# Patient Record
Sex: FEMALE | Race: WHITE
[De-identification: names, ages, dates, MRNs, and addresses within clinical notes are randomized per-mention and may not be internally consistent; named-entity substitution may affect disease eponyms.]

---

## 2018-09-30 ENCOUNTER — HOSPITAL ENCOUNTER (EMERGENCY)
Dept: HOSPITAL 80 - FED | Age: 28
Discharge: HOME | End: 2018-09-30
Payer: OTHER GOVERNMENT

## 2018-09-30 VITALS — SYSTOLIC BLOOD PRESSURE: 116 MMHG | DIASTOLIC BLOOD PRESSURE: 77 MMHG
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
## 2018-09-30 DIAGNOSIS — R11.2: ICD-10-CM

## 2018-09-30 DIAGNOSIS — E86.9: ICD-10-CM

## 2018-09-30 DIAGNOSIS — S80.212A: Primary | ICD-10-CM

## 2018-09-30 LAB — PLATELET # BLD: 314 10^3/UL (ref 150–400)

## 2018-09-30 NOTE — EDPHY
H & P


Time Seen by Provider: 09/30/18 01:43


HPI/ROS: 





HPI





CHIEF COMPLAINT:  Nausea vomiting





HISTORY OF PRESENT ILLNESS:  Very pleasant 28-year-old female, history of 

migraine headaches otherwise healthy, presents emergency room nausea vomiting.  

Patient was at work tonight.  She works 6:00 p.m. To 6:00 a.m. It is now 2:00 

a.m..  She works as a dispatcher.  She states she was sitting down at work and 

ultrasound very nauseous and vomited 1 time.  No headache.  Denies chest pain 

shortness of breath, denies cough.  Denies abdominal pain.  She states earlier 

today she felt like she had a fever to 102. Took a temperature orally.  No 

urinary symptoms.  She does have abrasions left lateral knee.  But no 

significant swelling or pain or redness.


Due to the vomiting at work not feeling well she decided come the emergency 

room.  She denies headache or stiff neck.  Denies fever currently.  Denies 

cough or shortness of breath.





  





Past Medical History:  Migraine headaches





Past Surgical History:  Denies recent surgery





Social History:  Denies drugs alcohol tobacco.





Family History:  Noncontributory








ROS   


REVIEW OF SYSTEMS:


10 Systems were reviewed and negative with the exception of the elements 

mentioned in the history of present illness.








Exam   


Constitutional  appears well nontoxic vital signs stable afebrile triage 

nursing summary reviewed, vital signs reviewed, awake/alert. 


Eyes   normal conjunctivae and sclera, EOMI, PERRLA. 


HENT   normal inspection, atraumatic, moist mucus membranes, no epistaxis, neck 

supple/ no meningismus, no raccoon eyes. 


Respiratory   clear to auscultation bilaterally, normal breath sounds, no 

respiratory distress, no wheezing. 


Cardiovascular   rate normal, regular rhythm, no murmur, no edema, distal 

pulses normal. 


Gastrointestinal   soft, non-tender, no rebound, no guarding, normal bowel 

sounds, no distension, no pulsatile mass. 


Genitourinary   no CVA tenderness. 


Musculoskeletal  no midline vertebral tenderness, full range of motion, no calf 

swelling, no tenderness of extremities, no meningismus, good pulses, 

neurovascularly intact.


Skin  circular 3 cm abrasion left lateral knee.  No significant cellulitis, 

abscess.


Neurologic   awake, alert and oriented x 3, AAOx3, moves all 4 extremities 

equally, motor intact, sensory intact, CN II-XII intact, normal cerebellar, 

normal vision, normal speech. 


Psychiatric   normal mood/affect. 


Heme/Lymph/Immune   no lymphadenopathy.





Differential Diagnosis:  Includes but is not limited to in a particular order 

dehydration, electrolyte disturbance, urinary tract infection, pregnancy, 

cellulitis.





Medical Decision Making:  Plan for this patient IV establishment IV fluid bolus

, Zofran for nausea, check basic electrolytes, CBC, urinalysis.  Re-evaluate.





Re-evaluation:


 


0403:  Re-evaluation at this time resting comfortably no acute distress feels 

better after IV Phenergan IV fluids.  Denies any chest pain or shortness of 

breath.  Abdomen remained soft.  His abrasion left lateral leg.  Will place on 

Keflex recommend warm soaks.  Watch closely.





For worsening symptoms return to the emergency room.





Patient is comfortable this plan.





Prescription provided for Keflex and Phenergan.





Return precautions discussed.





Source: Patient


Constitutional: 


 Initial Vital Signs











Temperature (C)  37.0 C   09/30/18 01:44


 


Heart Rate  87   09/30/18 01:44


 


Respiratory Rate  16   09/30/18 01:44


 


Blood Pressure  146/105 H  09/30/18 01:44


 


O2 Sat (%)  94   09/30/18 01:44








 











O2 Delivery Mode               Room Air














Allergies/Adverse Reactions: 


 





sumatriptan [From Imitrex] Allergy (Verified 09/30/18 01:54)


 








Home Medications: 














 Medication  Instructions  Recorded


 


Cephalexin [Keflex] 500 mg PO Q6H #28 cap 09/30/18


 


Promethazine HCl 25 mg PO Q6-8PRN PRN #10 tablet 09/30/18


 


Verapamil [Verapamil HCl] 5 mg 09/30/18














Medical Decision Making





- Data Points


Laboratory Results: 


 Laboratory Results





 09/30/18 02:05 





 09/30/18 02:05 





 











  09/30/18 09/30/18 09/30/18





  02:45 02:05 02:05


 


WBC      





    


 


RBC      





    


 


Hgb      





    


 


Hct      





    


 


MCV      





    


 


MCH      





    


 


MCHC      





    


 


RDW      





    


 


Plt Count      





    


 


MPV      





    


 


Neut % (Auto)      





    


 


Lymph % (Auto)      





    


 


Mono % (Auto)      





    


 


Eos % (Auto)      





    


 


Baso % (Auto)      





    


 


Nucleat RBC Rel Count      





    


 


Absolute Neuts (auto)      





    


 


Absolute Lymphs (auto)      





    


 


Absolute Monos (auto)      





    


 


Absolute Eos (auto)      





    


 


Absolute Basos (auto)      





    


 


Absolute Nucleated RBC      





    


 


Immature Gran %      





    


 


Immature Gran #      





    


 


Sodium      139 mEq/L mEq/L





     (135-145) 


 


Potassium      3.8 mEq/L mEq/L





     (3.3-5.0) 


 


Chloride      103 mEq/L mEq/L





     () 


 


Carbon Dioxide      24 mEq/l mEq/l





     (22-31) 


 


Anion Gap      12 mEq/L mEq/L





     (8-16) 


 


BUN      11 mg/dL mg/dL





     (7-23) 


 


Creatinine      0.7 mg/dL mg/dL





     (0.6-1.0) 


 


Estimated GFR      > 60 





    


 


Glucose      125 mg/dL H mg/dL





     () 


 


Calcium      9.9 mg/dL mg/dL





     (8.5-10.4) 


 


Total Bilirubin      0.3 mg/dL mg/dL





     (0.1-1.4) 


 


Conjugated Bilirubin      0.1 mg/dL mg/dL





     (0.0-0.5) 


 


Unconjugated Bilirubin      0.2 mg/dL mg/dL





     (0.0-1.1) 


 


AST      18 IU/L IU/L





     (14-46) 


 


ALT      29 IU/L IU/L





     (9-52) 


 


Alkaline Phosphatase      59 IU/L IU/L





     () 


 


Total Protein      7.2 g/dL g/dL





     (6.3-8.2) 


 


Albumin      4.4 g/dL g/dL





     (3.5-5.0) 


 


Lipase      141 IU/L IU/L





     () 


 


Beta HCG, Qual    NEGATIVE   





    


 


Urine Color  YELLOW     





    


 


Urine Appearance  HAZY     





    


 


Urine pH  5.0     





   (5.0-7.5)   


 


Ur Specific Gravity  1.025     





   (1.002-1.030)   


 


Urine Protein  NEGATIVE     





   (NEGATIVE)   


 


Urine Ketones  NEGATIVE     





   (NEGATIVE)   


 


Urine Blood  NEGATIVE     





   (NEGATIVE)   


 


Urine Nitrate  NEGATIVE     





   (NEGATIVE)   


 


Urine Bilirubin  NEGATIVE     





   (NEGATIVE)   


 


Urine Urobilinogen  NEGATIVE EU EU    





   (0.2-1.0)   


 


Ur Leukocyte Esterase  NEGATIVE     





   (NEGATIVE)   


 


Urine Glucose  NEGATIVE     





   (NEGATIVE)   














  09/30/18





  02:05


 


WBC  8.86 10^3/uL 10^3/uL





   (3.80-9.50) 


 


RBC  4.50 10^6/uL 10^6/uL





   (4.18-5.33) 


 


Hgb  14.7 g/dL g/dL





   (12.6-16.3) 


 


Hct  39.6 % %





   (38.0-47.0) 


 


MCV  88.0 fL fL





   (81.5-99.8) 


 


MCH  32.7 pg pg





   (27.9-34.1) 


 


MCHC  37.1 g/dL H g/dL





   (32.4-36.7) 


 


RDW  11.7 % %





   (11.5-15.2) 


 


Plt Count  314 10^3/uL 10^3/uL





   (150-400) 


 


MPV  9.7 fL fL





   (8.7-11.7) 


 


Neut % (Auto)  61.2 % %





   (39.3-74.2) 


 


Lymph % (Auto)  32.1 % %





   (15.0-45.0) 


 


Mono % (Auto)  5.3 % %





   (4.5-13.0) 


 


Eos % (Auto)  0.6 % %





   (0.6-7.6) 


 


Baso % (Auto)  0.7 % %





   (0.3-1.7) 


 


Nucleat RBC Rel Count  0.0 % %





   (0.0-0.2) 


 


Absolute Neuts (auto)  5.43 10^3/uL 10^3/uL





   (1.70-6.50) 


 


Absolute Lymphs (auto)  2.84 10^3/uL 10^3/uL





   (1.00-3.00) 


 


Absolute Monos (auto)  0.47 10^3/uL 10^3/uL





   (0.30-0.80) 


 


Absolute Eos (auto)  0.05 10^3/uL 10^3/uL





   (0.03-0.40) 


 


Absolute Basos (auto)  0.06 10^3/uL 10^3/uL





   (0.02-0.10) 


 


Absolute Nucleated RBC  0.00 10^3/uL 10^3/uL





   (0-0.01) 


 


Immature Gran %  0.1 % %





   (0.0-1.1) 


 


Immature Gran #  0.01 10^3/uL 10^3/uL





   (0.00-0.10) 


 


Sodium  





  


 


Potassium  





  


 


Chloride  





  


 


Carbon Dioxide  





  


 


Anion Gap  





  


 


BUN  





  


 


Creatinine  





  


 


Estimated GFR  





  


 


Glucose  





  


 


Calcium  





  


 


Total Bilirubin  





  


 


Conjugated Bilirubin  





  


 


Unconjugated Bilirubin  





  


 


AST  





  


 


ALT  





  


 


Alkaline Phosphatase  





  


 


Total Protein  





  


 


Albumin  





  


 


Lipase  





  


 


Beta HCG, Qual  





  


 


Urine Color  





  


 


Urine Appearance  





  


 


Urine pH  





  


 


Ur Specific Gravity  





  


 


Urine Protein  





  


 


Urine Ketones  





  


 


Urine Blood  





  


 


Urine Nitrate  





  


 


Urine Bilirubin  





  


 


Urine Urobilinogen  





  


 


Ur Leukocyte Esterase  





  


 


Urine Glucose  





  











Medications Given: 


 








Discontinued Medications





Sodium Chloride (Ns)  1,000 mls @ 0 mls/hr IV EDNOW ONE; Wide Open


   PRN Reason: Protocol


   Stop: 09/30/18 01:57


   Last Admin: 09/30/18 02:03 Dose:  1,000 mls


Sodium Chloride (Ns)  1,000 mls @ 0 mls/hr IV ONCE ONE


   PRN Reason: Wide Open


   Stop: 09/30/18 02:47


   Last Admin: 09/30/18 02:49 Dose:  1,000 mls


Ondansetron HCl (Zofran)  4 mg IVP EDNOW ONE


   Stop: 09/30/18 01:57


   Last Admin: 09/30/18 02:08 Dose:  4 mg


Promethazine HCl (Phenergan)  12.5 mg IVP ONCE ONE


   Stop: 09/30/18 02:47


   Last Admin: 09/30/18 02:51 Dose:  12.5 mg








Departure





- Departure


Disposition: Home, Routine, Self-Care


Clinical Impression: 


 Abrasion





Vomiting


Qualifiers:


 Vomiting type: unspecified Vomiting Intractability: non-intractable Nausea 

presence: with nausea Qualified Code(s): R11.2 - Nausea with vomiting, 

unspecified





Condition: Good


Instructions:  Abrasion (ED)


Additional Instructions: 


1. [place] diet over the next 24-48 hours.


2. Stay well-hydrated.


3. Return if worsening symptoms including increasing pain, fever, vomiting.


Referrals: 


NONE *PRIMARY CARE P,. [Primary Care Provider] - As per Instructions


Prescriptions: 


Cephalexin [Keflex] 500 mg PO Q6H #28 cap


Promethazine HCl 25 mg PO Q6-8PRN PRN #10 tablet


 PRN Reason: Nausea/Vomiting, Use 1st

## 2021-03-12 ENCOUNTER — WALK IN (OUTPATIENT)
Dept: URGENT CARE | Age: 31
End: 2021-03-12

## 2021-03-12 ENCOUNTER — TELEPHONE (OUTPATIENT)
Dept: URGENT CARE | Age: 31
End: 2021-03-12

## 2021-03-12 DIAGNOSIS — J02.9 PHARYNGITIS, UNSPECIFIED ETIOLOGY: Primary | ICD-10-CM

## 2021-03-12 LAB — S PYO DNA THROAT QL NAA+PROBE: NOT DETECTED

## 2021-03-12 PROCEDURE — 99213 OFFICE O/P EST LOW 20 MIN: CPT | Performed by: NURSE PRACTITIONER

## 2021-03-12 PROCEDURE — 87651 STREP A DNA AMP PROBE: CPT | Performed by: INTERNAL MEDICINE

## 2021-03-12 RX ORDER — PROPRANOLOL HYDROCHLORIDE 40 MG/1
40 TABLET ORAL 3 TIMES DAILY
COMMUNITY

## 2021-03-12 ASSESSMENT — ENCOUNTER SYMPTOMS
ACTIVITY CHANGE: 1
CHILLS: 0
TROUBLE SWALLOWING: 1
FEVER: 0
SORE THROAT: 1
RESPIRATORY NEGATIVE: 1

## 2021-03-12 ASSESSMENT — PAIN SCALES - GENERAL: PAINLEVEL: 1-2

## 2021-05-26 VITALS
DIASTOLIC BLOOD PRESSURE: 64 MMHG | HEART RATE: 70 BPM | HEIGHT: 66 IN | BODY MASS INDEX: 28.12 KG/M2 | OXYGEN SATURATION: 96 % | RESPIRATION RATE: 16 BRPM | TEMPERATURE: 98.4 F | SYSTOLIC BLOOD PRESSURE: 118 MMHG | WEIGHT: 175 LBS

## 2021-09-20 ENCOUNTER — WALK IN (OUTPATIENT)
Dept: URGENT CARE | Age: 31
End: 2021-09-20

## 2021-09-20 VITALS
TEMPERATURE: 98.6 F | HEIGHT: 66 IN | OXYGEN SATURATION: 97 % | DIASTOLIC BLOOD PRESSURE: 66 MMHG | BODY MASS INDEX: 30.55 KG/M2 | SYSTOLIC BLOOD PRESSURE: 118 MMHG | RESPIRATION RATE: 16 BRPM | WEIGHT: 190.1 LBS | HEART RATE: 80 BPM

## 2021-09-20 DIAGNOSIS — J45.21 MILD INTERMITTENT ASTHMA WITH ACUTE EXACERBATION: ICD-10-CM

## 2021-09-20 DIAGNOSIS — R06.89 BREATHING DIFFICULTY: Primary | ICD-10-CM

## 2021-09-20 PROCEDURE — U0005 INFEC AGEN DETEC AMPLI PROBE: HCPCS | Performed by: INTERNAL MEDICINE

## 2021-09-20 PROCEDURE — U0003 INFECTIOUS AGENT DETECTION BY NUCLEIC ACID (DNA OR RNA); SEVERE ACUTE RESPIRATORY SYNDROME CORONAVIRUS 2 (SARS-COV-2) (CORONAVIRUS DISEASE [COVID-19]), AMPLIFIED PROBE TECHNIQUE, MAKING USE OF HIGH THROUGHPUT TECHNOLOGIES AS DESCRIBED BY CMS-2020-01-R: HCPCS | Performed by: INTERNAL MEDICINE

## 2021-09-20 PROCEDURE — 99214 OFFICE O/P EST MOD 30 MIN: CPT | Performed by: FAMILY MEDICINE

## 2021-09-20 RX ORDER — ALBUTEROL SULFATE 90 UG/1
2 AEROSOL, METERED RESPIRATORY (INHALATION) EVERY 4 HOURS PRN
Qty: 1 EACH | Refills: 1 | Status: SHIPPED | OUTPATIENT
Start: 2021-09-20

## 2021-09-20 RX ORDER — ALBUTEROL SULFATE 90 UG/1
2 AEROSOL, METERED RESPIRATORY (INHALATION) EVERY 4 HOURS PRN
COMMUNITY

## 2021-09-20 ASSESSMENT — ENCOUNTER SYMPTOMS
CHILLS: 0
DIARRHEA: 0
HEADACHES: 0
FATIGUE: 0
ABDOMINAL PAIN: 0
NAUSEA: 0
SINUS PRESSURE: 0
EYE REDNESS: 0
LIGHT-HEADEDNESS: 0
SINUS PAIN: 0
RHINORRHEA: 0
TROUBLE SWALLOWING: 0
FEVER: 0
SHORTNESS OF BREATH: 1
VOMITING: 0
COUGH: 1
EYE ITCHING: 0
SORE THROAT: 0
WHEEZING: 0
EYE DISCHARGE: 0
CHEST TIGHTNESS: 1

## 2021-09-21 ENCOUNTER — TELEPHONE (OUTPATIENT)
Dept: URGENT CARE | Age: 31
End: 2021-09-21

## 2021-09-21 LAB
SARS-COV-2 RNA RESP QL NAA+PROBE: NOT DETECTED
SERVICE CMNT-IMP: NORMAL
SERVICE CMNT-IMP: NORMAL

## 2022-09-26 ENCOUNTER — TELEPHONE (OUTPATIENT)
Dept: URGENT CARE | Age: 32
End: 2022-09-26

## 2022-09-26 ENCOUNTER — WALK IN (OUTPATIENT)
Dept: URGENT CARE | Age: 32
End: 2022-09-26

## 2022-09-26 VITALS
OXYGEN SATURATION: 99 % | BODY MASS INDEX: 31.02 KG/M2 | HEART RATE: 88 BPM | WEIGHT: 193 LBS | SYSTOLIC BLOOD PRESSURE: 98 MMHG | DIASTOLIC BLOOD PRESSURE: 62 MMHG | HEIGHT: 66 IN | TEMPERATURE: 99 F | RESPIRATION RATE: 14 BRPM

## 2022-09-26 DIAGNOSIS — J00 COMMON COLD: ICD-10-CM

## 2022-09-26 DIAGNOSIS — J02.9 SORE THROAT: Primary | ICD-10-CM

## 2022-09-26 LAB — S PYO DNA THROAT QL NAA+PROBE: NOT DETECTED

## 2022-09-26 PROCEDURE — 87651 STREP A DNA AMP PROBE: CPT | Performed by: INTERNAL MEDICINE

## 2022-09-26 PROCEDURE — 99213 OFFICE O/P EST LOW 20 MIN: CPT | Performed by: FAMILY MEDICINE

## 2022-09-26 ASSESSMENT — PAIN SCALES - GENERAL: PAINLEVEL: 5
